# Patient Record
Sex: MALE | Race: WHITE | NOT HISPANIC OR LATINO | Employment: OTHER | URBAN - METROPOLITAN AREA
[De-identification: names, ages, dates, MRNs, and addresses within clinical notes are randomized per-mention and may not be internally consistent; named-entity substitution may affect disease eponyms.]

---

## 2019-05-23 ENCOUNTER — HOSPITAL ENCOUNTER (OUTPATIENT)
Dept: CT IMAGING | Facility: HOSPITAL | Age: 69
Discharge: HOME | End: 2019-05-23

## 2019-06-04 ENCOUNTER — CONVERSION ENCOUNTER (OUTPATIENT)
Dept: SURGERY | Facility: CLINIC | Age: 69
End: 2019-06-04

## 2019-06-04 VITALS
HEART RATE: 50 BPM | OXYGEN SATURATION: 98 % | HEIGHT: 70 IN | BODY MASS INDEX: 24.48 KG/M2 | WEIGHT: 171 LBS | SYSTOLIC BLOOD PRESSURE: 174 MMHG | DIASTOLIC BLOOD PRESSURE: 70 MMHG

## 2019-06-06 NOTE — PROGRESS NOTES
Visit Type:  Consult  Referring Provider:  Dr. Orta  Primary Provider:  Dr. Orta    CC:  6 month follow up CT chest/ RML lung cancer .    History of Present Illness:  Patient is seen in postoperative follow-up now 3 years out from right middle lobe resection for metastatic colorectal cancer.  Patient recently underwent colonoscopy and by his report has no evidence of recurrent colon cancer.  He presents to me with a   repeat CT scan of the chest which I personally examined.  There is no suspicious lesion in the lung.  There is no evidence of metastatic colorectal cancer in the chest.  The patient is clinically well.  He had some minor bleeding following the colonoscopy.  This is resolved.  No fevers chills or night sweats.  No cough no hemoptysis.  The patient is completed all review of systems.  They are all otherwise negative.  Medications are reviewed.  He is not anticoagulating.  On physical examination chest expansion symmetrical no supraclavicular cervical or tracheal adenopathy.  Abdomen is nondistended nontender.  Patient is a never smoker encouraged to stay smoke-free    Impression 1. History of colon cancer no evidence of recurrence 2.  History of metastatic colon cancer to the lung no evidence of recurrent there  Plan follow-up CT scan in 6 months      Past Medical History:     Reviewed history from 11/27/2018 and no changes required:        Colon Cancer        Nodule of Right Lung         Lung Cancer        blood transfusion 2012    Past Surgical History:     Reviewed history from 11/08/2016 and no changes required:        Right Lung Resection 6/2016        Cyst removed on top of head 2012        Hernia Right Side Left Side 2012        Colorectal Resection 2012    Family History Summary:      Reviewed history Last on 11/27/2018 and no changes required:06/04/2019  Brother - Has FH Heart Disease - Entered On: 11/8/2016  Mother - Has FH Hypertension - Entered On: 11/8/2016  Mother - Has FH Heart  Disease - Entered On: 2016  Father - Has FH Heart Disease - Entered On: 2016      Social History:     Reviewed history from 2018 and no changes required:        Patient has never smoked.        Alcohol Use: N        Drug Use: N        Regular Exercise: Y            Social History Summary:  Patient has never smoked.  Alcohol Use: N  Drug Use: N  Regular Exercise: Y  Social History Reviewed: 2019          Vital Signs:    Patient Profile:    68 Years Old Male  Height:     70 inches  Weight:     171 pounds  BMI:        24.53     O2 Sat:     98 %  Temp:       97.1 degrees F oral  Pulse rate: 50 / minute  Pulse rhythm:   regular  BP Sittin / 70  (left arm)    Cuff size:  regular      Problems: Active problems were reviewed with the patient during this visit.  Medications: Medications were reviewed with the patient during this visit.  Allergies: Allergies were reviewed with the patient during this visit.        Vitals Entered By: Sheree Lopez CMA (2019 7:59 AM)    Active Medications (reviewed today):  LOPERAMIDE HCL 2 MG ORAL CAPSULE (LOPERAMIDE HCL) Take two (2) capsules by mouth four times a day  DIPHENOXYLATE-ATROPINE 2.5-0.025 MG ORAL TABLET (DIPHENOXYLATE-ATROPINE) Take one (1) capsule by mouth four times a day  DICYCLOMINE HCL 10 MG ORAL CAPSULE (DICYCLOMINE HCL) Take one (1) capsule by mouth four times a day    Current Allergies (reviewed today):  IODINE (Critical)  * CONTRAST DYE (Critical)      Risk Factors:     Smoked Tobacco Use:  Never smoker  Smokeless Tobacco Use:  Never  Drug use:  no  Alcohol use:  no  Exercise:  yes  Seatbelt use:  100 %    Previous Tobacco Use: Signed On - 2018  Smoked Tobacco Use:  Never smoker  Smokeless Tobacco Use:  Never  Drug use:  no    Previous Alcohol Use: Signed On - 2018  Alcohol use:  no  Exercise:  yes  Seatbelt use:  100 %          Blood Pressure:  Today's BP: 174/70 mm Hg            Plan   Updated Medication List:    LOPERAMIDE HCL 2 MG ORAL CAPSULE (LOPERAMIDE HCL) Take two (2) capsules by mouth four times a day  DIPHENOXYLATE-ATROPINE 2.5-0.025 MG ORAL TABLET (DIPHENOXYLATE-ATROPINE) Take one (1) capsule by mouth four times a day  DICYCLOMINE HCL 10 MG ORAL CAPSULE (DICYCLOMINE HCL) Take one (1) capsule by mouth four times a day    New Orders:   [id number] Vst-Est Level IV [CPT-84820]  CT CHEST WITHOUT [CPT-41715]        Plan   Updated Medication List:   LOPERAMIDE HCL 2 MG ORAL CAPSULE (LOPERAMIDE HCL) Take two (2) capsules by mouth four times a day  DIPHENOXYLATE-ATROPINE 2.5-0.025 MG ORAL TABLET (DIPHENOXYLATE-ATROPINE) Take one (1) capsule by mouth four times a day  DICYCLOMINE HCL 10 MG ORAL CAPSULE (DICYCLOMINE HCL) Take one (1) capsule by mouth four times a day    New Orders:   [id number] Vst-Est Level IV [CPT-62388]  CT CHEST WITHOUT [CPT-80955]        Surgery Worksheet         ]      Electronically signed by Lizandro Rodriguez MD on 06/04/2019 at 10:39 AM  ________________________________________________________________________       Disclaimer: Converted Note message may not contain all data elements that existed in the legacy source system. Please see [employer] [facility] for the original note details.

## 2019-12-16 ENCOUNTER — HOSPITAL ENCOUNTER (OUTPATIENT)
Dept: CT IMAGING | Facility: HOSPITAL | Age: 69
Discharge: HOME | End: 2019-12-16

## 2019-12-19 ENCOUNTER — OFFICE VISIT (OUTPATIENT)
Dept: SURGERY | Facility: CLINIC | Age: 69
End: 2019-12-19

## 2019-12-19 VITALS
OXYGEN SATURATION: 98 % | BODY MASS INDEX: 23.39 KG/M2 | DIASTOLIC BLOOD PRESSURE: 69 MMHG | HEART RATE: 58 BPM | WEIGHT: 163 LBS | TEMPERATURE: 97.2 F | SYSTOLIC BLOOD PRESSURE: 128 MMHG

## 2019-12-19 DIAGNOSIS — C18.9 MALIGNANT NEOPLASM OF COLON, UNSPECIFIED PART OF COLON (HCC): ICD-10-CM

## 2019-12-19 DIAGNOSIS — C78.01 MALIGNANT NEOPLASM METASTATIC TO RIGHT LUNG (HCC): Primary | ICD-10-CM

## 2019-12-19 PROCEDURE — 99213 OFFICE O/P EST LOW 20 MIN: CPT | Performed by: THORACIC SURGERY (CARDIOTHORACIC VASCULAR SURGERY)

## 2019-12-19 RX ORDER — LOPERAMIDE HYDROCHLORIDE 2 MG/1
CAPSULE ORAL
COMMUNITY
Start: 2019-11-11 | End: 2022-11-02

## 2019-12-19 RX ORDER — DIPHENOXYLATE HYDROCHLORIDE AND ATROPINE SULFATE 2.5; .025 MG/1; MG/1
1 TABLET ORAL 4 TIMES DAILY
COMMUNITY
Start: 2019-10-09

## 2019-12-19 RX ORDER — DICYCLOMINE HYDROCHLORIDE 10 MG/1
10 CAPSULE ORAL
COMMUNITY
Start: 2016-11-04

## 2019-12-19 RX ORDER — LOSARTAN POTASSIUM AND HYDROCHLOROTHIAZIDE 12.5; 5 MG/1; MG/1
TABLET ORAL
COMMUNITY
Start: 2019-10-31 | End: 2022-11-02

## 2019-12-19 NOTE — PROGRESS NOTES
Thoracic surgery progress note    Chief complaint follow-up of metastatic colon cancer status post right middle lobe resection 3-1/2 years ago    Interval history the patient presents with a follow-up CT scan performed on December 16 which I personally examined.  There is no evidence of recurrent disease.  There is no evidence of local recurrence or mediastinal adenopathy.  I have reviewed the radiology report as well and concur with it.  Her    The patient has frequent bowel movements 4-5 a day.  No hematochezia.  Approximately 6 months ago he underwent a colonoscopy that was unremarkable.  As per his report.  Patient is not having any fevers chills or night sweats.  No cough no hemoptysis no change in voice his appetite is good his weight is stable.  The patient is a never smoker encouraged to stay smoke-free    Review of systems all systems reviewed the patient does not report any symptomatology.    On physical examination there is no supraclavicular cervical lymphadenopathy chest expansion symmetrical trachea is midline abdomen is nondistended nontender extremities free of cyanosis clubbing or edema    Impression #1 history of colon cancer status post partial colectomy no evidence of recurrent cancer either by physical examination or history.    2.  History of metastatic disease to the right middle lobe.  No evidence of recurrent cancer either symptomatically or radiographically    Plan follow-up CT scan in 6 months

## 2019-12-21 ENCOUNTER — HOSPITAL ENCOUNTER (OUTPATIENT)
Dept: OTHER | Facility: HOSPITAL | Age: 69
Discharge: HOME | End: 2019-12-21

## 2019-12-21 DIAGNOSIS — Z00.6 EXAMINATION FOR NORMAL COMPARISON OR CONTROL IN CLINICAL RESEARCH: ICD-10-CM

## 2020-06-17 ENCOUNTER — HOSPITAL ENCOUNTER (OUTPATIENT)
Dept: CT IMAGING | Facility: HOSPITAL | Age: 70
Discharge: HOME | End: 2020-06-17

## 2020-06-18 ENCOUNTER — OFFICE VISIT (OUTPATIENT)
Dept: SURGERY | Facility: CLINIC | Age: 70
End: 2020-06-18

## 2020-06-18 VITALS
DIASTOLIC BLOOD PRESSURE: 74 MMHG | BODY MASS INDEX: 23.34 KG/M2 | WEIGHT: 163 LBS | SYSTOLIC BLOOD PRESSURE: 151 MMHG | OXYGEN SATURATION: 98 % | HEIGHT: 70 IN | HEART RATE: 54 BPM | TEMPERATURE: 98.2 F

## 2020-06-18 DIAGNOSIS — C78.01 MALIGNANT NEOPLASM METASTATIC TO RIGHT LUNG (HCC): Primary | ICD-10-CM

## 2020-06-18 DIAGNOSIS — Z85.048 HISTORY OF RECTAL CANCER: ICD-10-CM

## 2020-06-18 PROCEDURE — 99213 OFFICE O/P EST LOW 20 MIN: CPT | Performed by: THORACIC SURGERY (CARDIOTHORACIC VASCULAR SURGERY)

## 2020-06-18 NOTE — PROGRESS NOTES
Thoracic surgery progress note    Chief complaint follow-up of metastatic colon cancer resected from the right middle lobe 4 years ago.    The patient is doing very well.  He still has frequent bowel movements.  No fever chills night sweats cough hemoptysis no abdominal pain.  He has had a colonoscopy within the last year without evidence of recurrent disease.  He reports no hematochezia.    All point review of systems reviewed with the patient.  It is otherwise negative.  Medications are reviewed.    The patient is a never smoker encouraged to stay smoke-free    On physical examination he has no supraclavicular lymphadenopathy chest expansion symmetrical.  Extremities free of cyanosis clubbing or edema    1.  History of metastatic colon cancer to the right middle lobe no evidence of recurrent disease  #2 frequent bowel movements under good control.  3.  History of colon cancer no evidence of recurrence negative colonoscopy    Plan follow-up 6 months with CT scan

## 2020-12-11 ENCOUNTER — HOSPITAL ENCOUNTER (OUTPATIENT)
Dept: CT IMAGING | Facility: HOSPITAL | Age: 70
Discharge: HOME | End: 2020-12-11

## 2020-12-22 ENCOUNTER — OFFICE VISIT (OUTPATIENT)
Dept: SURGERY | Facility: CLINIC | Age: 70
End: 2020-12-22

## 2020-12-22 VITALS
HEART RATE: 58 BPM | BODY MASS INDEX: 24.2 KG/M2 | DIASTOLIC BLOOD PRESSURE: 70 MMHG | TEMPERATURE: 98 F | WEIGHT: 169 LBS | SYSTOLIC BLOOD PRESSURE: 167 MMHG | OXYGEN SATURATION: 99 % | HEIGHT: 70 IN

## 2020-12-22 DIAGNOSIS — C78.01 MALIGNANT NEOPLASM METASTATIC TO RIGHT LUNG (HCC): Primary | ICD-10-CM

## 2020-12-22 PROCEDURE — 99212 OFFICE O/P EST SF 10 MIN: CPT | Performed by: THORACIC SURGERY (CARDIOTHORACIC VASCULAR SURGERY)

## 2020-12-22 NOTE — PROGRESS NOTES
Thoracic surgery progress note    Chief complaint history of rectal cancer with  metastases resected from the right lung 4 and half years ago    The patient returns with a follow-up CT scan that shows no evidence of recurrent cancer.  He is well-appearing and in no distress.  He has no new symptoms.  No fevers chills night sweats cough or hemoptysis.    He had a CT scan done at [facility] on December 11 that showed no evidence of recurrent disease.    On physical examination he needs and well-appearing and in no distress.    I discussed with the patient that I will be moving to [place].  He wishes to follow-up locally with Dr. Hong.  I thought it was a good idea to get a repeat CT scan in 1 year.  A copy of this note will be given to the patient

## 2022-05-26 ENCOUNTER — TRANSCRIBE ORDERS (OUTPATIENT)
Dept: ADMINISTRATIVE | Facility: HOSPITAL | Age: 72
End: 2022-05-26

## 2022-05-26 DIAGNOSIS — R91.8 PULMONARY NODULES: Primary | ICD-10-CM

## 2022-06-15 ENCOUNTER — CLINICAL SUPPORT (OUTPATIENT)
Dept: GASTROENTEROLOGY | Facility: CLINIC | Age: 72
End: 2022-06-15

## 2022-06-15 ENCOUNTER — PREP FOR SURGERY (OUTPATIENT)
Dept: OTHER | Facility: HOSPITAL | Age: 72
End: 2022-06-15

## 2022-06-15 DIAGNOSIS — Z12.11 SCREENING FOR COLON CANCER: Primary | ICD-10-CM

## 2022-06-15 DIAGNOSIS — Z12.11 COLON CANCER SCREENING: Primary | ICD-10-CM

## 2022-06-15 RX ORDER — SODIUM, POTASSIUM,MAG SULFATES 17.5-3.13G
1 SOLUTION, RECONSTITUTED, ORAL ORAL EVERY 12 HOURS
Qty: 354 ML | Refills: 0 | Status: SHIPPED | OUTPATIENT
Start: 2022-06-15 | End: 2022-10-21 | Stop reason: SDUPTHER

## 2022-06-15 RX ORDER — LISINOPRIL 20 MG/1
20 TABLET ORAL DAILY
COMMUNITY
End: 2023-03-01

## 2022-06-15 RX ORDER — LOSARTAN POTASSIUM 50 MG/1
50 TABLET ORAL DAILY
COMMUNITY
Start: 2022-05-25 | End: 2023-03-01

## 2022-06-15 RX ORDER — CIPROFLOXACIN 500 MG/1
TABLET, FILM COATED ORAL
COMMUNITY
End: 2022-11-02

## 2022-06-15 RX ORDER — HYDROCHLOROTHIAZIDE 25 MG/1
25 TABLET ORAL DAILY
COMMUNITY
Start: 2022-05-25

## 2022-06-15 RX ORDER — HYDROCODONE BITARTRATE AND ACETAMINOPHEN 10; 325 MG/1; MG/1
TABLET ORAL
COMMUNITY
End: 2022-11-02

## 2022-06-15 RX ORDER — FLUVASTATIN SODIUM 80 MG/1
TABLET, FILM COATED, EXTENDED RELEASE ORAL
COMMUNITY
End: 2023-03-01

## 2022-06-15 NOTE — PROGRESS NOTES
Salvador Huertas  REASON FOR CALL Screening for colonoscopy   SENT IN PREP Suprep   History reviewed. No pertinent past medical history.  Allergies   Allergen Reactions   • Contrast Dye Unknown - Low Severity   • Iodine Unknown - Low Severity     Past Surgical History:   Procedure Laterality Date   • CATARACT EXTRACTION, BILATERAL Bilateral 06/2020   • COLON RESECTION  2012   • CYST REMOVAL  2012    CYST REMOVAL TOP OF HEAD   • HERNIA REPAIR Right 2012   • LUNG SURGERY Right 06/06/2016    INTUBATING BRONCH, RIGHT VATS WEDGE RESEC. RML PN,       Social History     Socioeconomic History   • Marital status:    Tobacco Use   • Smoking status: Never Smoker   • Smokeless tobacco: Never Used     Family History   Problem Relation Age of Onset   • Hypertension Mother    • Heart disease Mother    • Heart disease Father    • Heart disease Brother        Current Outpatient Medications:   •  dicyclomine (BENTYL) 10 MG capsule, DICYCLOMINE HCL 10 MG CAPS, Disp: , Rfl:   •  diphenoxylate-atropine (LOMOTIL) 2.5-0.025 MG per tablet, Take 1 tablet by mouth 4 (Four) Times a Day., Disp: , Rfl:   •  hydroCHLOROthiazide (HYDRODIURIL) 25 MG tablet, , Disp: , Rfl:   •  lisinopril (PRINIVIL,ZESTRIL) 20 MG tablet, Take  by mouth., Disp: , Rfl:   •  loperamide (IMODIUM) 2 MG capsule, TAKE 2 CAPSULES BY MOUTH 4 TIMES DAILY, Disp: , Rfl:   •  losartan (COZAAR) 50 MG tablet, , Disp: , Rfl:   •  ciprofloxacin (CIPRO) 500 MG tablet, Take  by mouth., Disp: , Rfl:   •  fluvastatin XL (LESCOL XL) 80 MG 24 hr tablet, Take  by mouth., Disp: , Rfl:   •  HYDROcodone-acetaminophen (NORCO)  MG per tablet, Take  by mouth., Disp: , Rfl:   •  losartan-hydrochlorothiazide (HYZAAR) 50-12.5 MG per tablet, , Disp: , Rfl:

## 2022-09-13 ENCOUNTER — HOSPITAL ENCOUNTER (OUTPATIENT)
Dept: CT IMAGING | Facility: HOSPITAL | Age: 72
Discharge: HOME | End: 2022-09-13
Admitting: FAMILY MEDICINE

## 2022-09-13 DIAGNOSIS — R91.8 PULMONARY NODULES: ICD-10-CM

## 2022-09-13 PROCEDURE — 71250 CT THORAX DX C-: CPT

## 2022-10-24 RX ORDER — SODIUM, POTASSIUM,MAG SULFATES 17.5-3.13G
1 SOLUTION, RECONSTITUTED, ORAL ORAL EVERY 12 HOURS
Qty: 354 ML | Refills: 0 | Status: SHIPPED | OUTPATIENT
Start: 2022-10-24 | End: 2022-11-03 | Stop reason: HOSPADM

## 2022-11-03 ENCOUNTER — HOSPITAL ENCOUNTER (OUTPATIENT)
Facility: HOSPITAL | Age: 72
Setting detail: HOSPITAL OUTPATIENT SURGERY
Discharge: HOME | End: 2022-11-03
Attending: INTERNAL MEDICINE | Admitting: INTERNAL MEDICINE

## 2022-11-03 ENCOUNTER — ANESTHESIA EVENT (OUTPATIENT)
Dept: GASTROENTEROLOGY | Facility: HOSPITAL | Age: 72
End: 2022-11-03

## 2022-11-03 ENCOUNTER — ANESTHESIA (OUTPATIENT)
Dept: GASTROENTEROLOGY | Facility: HOSPITAL | Age: 72
End: 2022-11-03

## 2022-11-03 VITALS
OXYGEN SATURATION: 98 % | WEIGHT: 157.41 LBS | BODY MASS INDEX: 22.54 KG/M2 | SYSTOLIC BLOOD PRESSURE: 145 MMHG | HEART RATE: 52 BPM | DIASTOLIC BLOOD PRESSURE: 68 MMHG | TEMPERATURE: 98.7 F | RESPIRATION RATE: 18 BRPM | HEIGHT: 70 IN

## 2022-11-03 DIAGNOSIS — Z12.11 SCREENING FOR COLON CANCER: ICD-10-CM

## 2022-11-03 PROCEDURE — 45385 COLONOSCOPY W/LESION REMOVAL: CPT | Performed by: INTERNAL MEDICINE

## 2022-11-03 PROCEDURE — 45380 COLONOSCOPY AND BIOPSY: CPT | Performed by: INTERNAL MEDICINE

## 2022-11-03 PROCEDURE — 25010000002 PROPOFOL 10 MG/ML EMULSION: Performed by: NURSE ANESTHETIST, CERTIFIED REGISTERED

## 2022-11-03 PROCEDURE — 88305 TISSUE EXAM BY PATHOLOGIST: CPT | Performed by: INTERNAL MEDICINE

## 2022-11-03 RX ORDER — LIDOCAINE HYDROCHLORIDE 20 MG/ML
INJECTION, SOLUTION EPIDURAL; INFILTRATION; INTRACAUDAL; PERINEURAL AS NEEDED
Status: DISCONTINUED | OUTPATIENT
Start: 2022-11-03 | End: 2022-11-03 | Stop reason: SURG

## 2022-11-03 RX ORDER — PROPOFOL 10 MG/ML
VIAL (ML) INTRAVENOUS AS NEEDED
Status: DISCONTINUED | OUTPATIENT
Start: 2022-11-03 | End: 2022-11-03 | Stop reason: SURG

## 2022-11-03 RX ORDER — SODIUM CHLORIDE, SODIUM LACTATE, POTASSIUM CHLORIDE, CALCIUM CHLORIDE 600; 310; 30; 20 MG/100ML; MG/100ML; MG/100ML; MG/100ML
30 INJECTION, SOLUTION INTRAVENOUS CONTINUOUS
Status: DISCONTINUED | OUTPATIENT
Start: 2022-11-03 | End: 2022-11-03 | Stop reason: HOSPADM

## 2022-11-03 RX ADMIN — PROPOFOL 50 MG: 10 INJECTION, EMULSION INTRAVENOUS at 10:07

## 2022-11-03 RX ADMIN — LIDOCAINE HYDROCHLORIDE 30 MG: 20 INJECTION, SOLUTION EPIDURAL; INFILTRATION; INTRACAUDAL; PERINEURAL at 10:07

## 2022-11-03 RX ADMIN — SODIUM CHLORIDE, POTASSIUM CHLORIDE, SODIUM LACTATE AND CALCIUM CHLORIDE 30 ML/HR: 600; 310; 30; 20 INJECTION, SOLUTION INTRAVENOUS at 09:22

## 2022-11-03 RX ADMIN — PROPOFOL 150 MCG/KG/MIN: 10 INJECTION, EMULSION INTRAVENOUS at 10:07

## 2022-11-03 NOTE — ANESTHESIA POSTPROCEDURE EVALUATION
Patient: Salvador Huertas    Procedure Summary     Date: 11/03/22 Room / Location: [facility] ENDOSCOPY 4 / [facility] ENDOSCOPY    Anesthesia Start: 1004 Anesthesia Stop: 1029    Procedure: COLONOSCOPY WITH BIOPSIES, POLYPECTOMY HOT SNARE Diagnosis:       Screening for colon cancer      (Screening for colon cancer [Z12.11])    Surgeons: Tata Villatoro MD Provider: Theodore Alberto MD    Anesthesia Type: general ASA Status: 2          Anesthesia Type: general    Vitals  Vitals Value Taken Time   /70 11/03/22 1028   Temp 37.1 °C (98.7 °F) 11/03/22 1028   Pulse 59 11/03/22 1032   Resp 18 11/03/22 1028   SpO2 98 % 11/03/22 1032   Vitals shown include unvalidated device data.        Post Anesthesia Care and Evaluation    Patient location during evaluation: bedside  Patient participation: complete - patient participated  Level of consciousness: awake  Pain management: adequate    Airway patency: patent  Anesthetic complications: No anesthetic complications  PONV Status: none  Cardiovascular status: acceptable and stable  Respiratory status: acceptable  Hydration status: acceptable    Comments: An Anesthesiologist personally participated in the most demanding procedures (including induction and emergence if applicable) in the anesthesia plan, monitored the course of anesthesia administration at frequent intervals and remained physically present and available for immediate diagnosis and treatment of emergencies.

## 2022-11-03 NOTE — ANESTHESIA PREPROCEDURE EVALUATION
Anesthesia Evaluation     Patient summary reviewed and Nursing notes reviewed   no history of anesthetic complications:  NPO Solid Status: > 8 hours  NPO Liquid Status: > 2 hours           Airway   Mallampati: I  TM distance: >3 FB  Neck ROM: full  No difficulty expected  Dental - normal exam     Pulmonary - normal exam   (+) lung cancer,   Cardiovascular - normal exam  Exercise tolerance: good (4-7 METS)    (+) hypertension well controlled,       Neuro/Psych- negative ROS  GI/Hepatic/Renal/Endo - negative ROS     Musculoskeletal (-) negative ROS    Abdominal  - normal exam    Bowel sounds: normal.   Substance History - negative use     OB/GYN negative ob/gyn ROS         Other      history of cancer remission    ROS/Med Hx Other: PAT Nursing Notes unavailable.                   Anesthesia Plan    ASA 2     general     (Patient understands anesthesia not responsible for dental damage.)  intravenous induction     Anesthetic plan, risks, benefits, and alternatives have been provided, discussed and informed consent has been obtained with: patient.  Pre-procedure education provided  Plan discussed with CRNA.        CODE STATUS:

## 2022-11-03 NOTE — H&P
Pre Procedure History & Physical    Chief Complaint:   Screening colonoscopy    Subjective     HPI:   72 yo M here for screening colonoscopy.    Past Medical History:   Past Medical History:   Diagnosis Date   • Cancer     Colorectal cancer   • Hypertension        Past Surgical History:  Past Surgical History:   Procedure Laterality Date   • CATARACT EXTRACTION, BILATERAL Bilateral 06/2020   • COLON RESECTION  2012   • COLONOSCOPY     • CYST REMOVAL  2012    CYST REMOVAL TOP OF HEAD   • HERNIA REPAIR Right 2012   • LUNG SURGERY Right 06/06/2016    INTUBATING BRONCH, RIGHT VATS WEDGE RESEC. RML PN,         Family History:  Family History   Problem Relation Age of Onset   • Hypertension Mother    • Heart disease Mother    • Heart disease Father    • Heart disease Brother    • Malig Hyperthermia Neg Hx        Social History:   reports that he has never smoked. He has never used smokeless tobacco. He reports that he does not drink alcohol and does not use drugs.    Medications:   Medications Prior to Admission   Medication Sig Dispense Refill Last Dose   • dicyclomine (BENTYL) 10 MG capsule Take 1 capsule by mouth 4 (Four) Times a Day Before Meals & at Bedtime.   10/31/2022   • diphenoxylate-atropine (LOMOTIL) 2.5-0.025 MG per tablet Take 1 tablet by mouth 4 (Four) Times a Day.   10/31/2022   • hydroCHLOROthiazide (HYDRODIURIL) 25 MG tablet Take 1 tablet by mouth Daily.   10/31/2022   • lisinopril (PRINIVIL,ZESTRIL) 20 MG tablet Take 1 tablet by mouth Daily.   10/31/2022   • LOPERAMIDE HCL PO Take 2 capsules by mouth Daily.   10/31/2022   • losartan (COZAAR) 50 MG tablet Take 1 tablet by mouth Daily.   10/31/2022   • fluvastatin XL (LESCOL XL) 80 MG 24 hr tablet Take  by mouth.   10/31/2022   • sodium-potassium-magnesium sulfates (Suprep Bowel Prep Kit) 17.5-3.13-1.6 GM/177ML solution oral solution Take 1 bottle by mouth Every 12 (Twelve) Hours. (Patient not taking: Reported on 11/3/2022) 354 mL 0 Not Taking  "      Allergies:  Contrast dye and Iodine    ROS:    Pertinent items are noted in HPI     Objective     Blood pressure 171/79, pulse 58, temperature 97.9 °F (36.6 °C), temperature source Temporal, resp. rate 18, height 177.8 cm (70\"), weight 71.4 kg (157 lb 6.5 oz), SpO2 97 %.    Physical Exam   Constitutional: Pt is oriented to person, place, and time and well-developed, well-nourished, and in no distress.   Mouth/Throat: Oropharynx is clear and moist.   Neck: Normal range of motion.   Cardiovascular: Normal rate, regular rhythm and normal heart sounds.    Pulmonary/Chest: Effort normal and breath sounds normal.   Abdominal: Soft. Nontender  Skin: Skin is warm and dry.   Psychiatric: Mood, memory, affect and judgment normal.     Assessment & Plan     Diagnosis:  Screening colonoscopy    Anticipated Surgical Procedure:  Colonoscopy    The risks, benefits, and alternatives of this procedure have been discussed with the patient or the responsible party- the patient understands and agrees to proceed.          "

## 2022-11-04 ENCOUNTER — TELEPHONE (OUTPATIENT)
Dept: GASTROENTEROLOGY | Facility: CLINIC | Age: 72
End: 2022-11-04

## 2022-11-04 DIAGNOSIS — K63.5 POLYP OF COLON: ICD-10-CM

## 2022-11-04 DIAGNOSIS — K52.9 COLITIS: Primary | ICD-10-CM

## 2022-11-04 LAB
CYTO UR: NORMAL
LAB AP CASE REPORT: NORMAL
LAB AP CLINICAL INFORMATION: NORMAL
PATH REPORT.FINAL DX SPEC: NORMAL
PATH REPORT.GROSS SPEC: NORMAL

## 2022-11-04 NOTE — TELEPHONE ENCOUNTER
----- Message from LARISA Cross sent at 11/4/2022 11:25 AM EDT -----  Rectal biopsy is consistent with ulcer.  I would like to treat this with a course of mesalamine.  I have sent this to patient's pharmacy.  Please schedule patient for office visit to discuss symptoms if he is having any.  Would also recommend labs.  Please have patient obtain these to check kidney function and rule out ulcerative colitis.

## 2022-11-17 NOTE — TELEPHONE ENCOUNTER
Spoke to pt & spouse informing of Anuradha PERES result note and recommendations. F/U apt scheduled next available on 3/1/2023. Pt and spouse verified understanding.     Pt does not report any concerns or issues, stating that he is not experiencing any diarrhea or rectal bleeding. Pt states that he does experiencing cramping from time to time but does not report it severe.     Mesalamine was not sent to Pharmacy. Please advise.

## 2022-11-17 NOTE — TELEPHONE ENCOUNTER
Spouse LVM on [facility] requesting call back on cell [phone].    Called provided number; LVM.

## 2022-11-18 RX ORDER — MESALAMINE 1000 MG/1
1000 SUPPOSITORY RECTAL NIGHTLY
Qty: 30 SUPPOSITORY | Refills: 2 | Status: SHIPPED | OUTPATIENT
Start: 2022-11-18 | End: 2022-12-18

## 2022-11-18 NOTE — TELEPHONE ENCOUNTER
I have discussed with Dr. Villatoro and a prescription for Canasa sent to pharmacy ([facility]). Please advise patient to complete ordered lab work as planned.

## 2023-03-01 ENCOUNTER — OFFICE VISIT (OUTPATIENT)
Dept: GASTROENTEROLOGY | Facility: CLINIC | Age: 73
End: 2023-03-01
Payer: MEDICARE

## 2023-03-01 VITALS
HEIGHT: 70 IN | DIASTOLIC BLOOD PRESSURE: 65 MMHG | SYSTOLIC BLOOD PRESSURE: 135 MMHG | WEIGHT: 163 LBS | HEART RATE: 68 BPM | BODY MASS INDEX: 23.34 KG/M2

## 2023-03-01 DIAGNOSIS — Z85.038 HISTORY OF COLON CANCER: ICD-10-CM

## 2023-03-01 DIAGNOSIS — K62.6 RECTAL/ANAL ULCER: Primary | ICD-10-CM

## 2023-03-01 DIAGNOSIS — Z90.49 HISTORY OF COLON RESECTION: ICD-10-CM

## 2023-03-01 DIAGNOSIS — R19.7 DIARRHEA, UNSPECIFIED TYPE: ICD-10-CM

## 2023-03-01 DIAGNOSIS — K62.89 PROCTITIS: ICD-10-CM

## 2023-03-01 PROCEDURE — 99214 OFFICE O/P EST MOD 30 MIN: CPT | Performed by: NURSE PRACTITIONER

## 2023-03-01 RX ORDER — LOSARTAN POTASSIUM 100 MG/1
TABLET ORAL
COMMUNITY
Start: 2023-02-17

## 2023-03-01 RX ORDER — MESALAMINE 1000 MG/1
SUPPOSITORY RECTAL
COMMUNITY
Start: 2023-02-15

## 2023-03-01 NOTE — PROGRESS NOTES
Chief Complaint     Rectal Problems and Follow-up (Rectal ulcer and colonoscopy follow up)    History of Present Illness     Salvador Huertas is a 72 y.o. male who presents to [facility] for follow-up of colonoscopy and rectal ulcer.    Mr. Huertas was scheduled for routine screening colonoscopy.  Colonoscopy revealed erythema and ulceration in the rectum.    He reports being diagnosed with colon cancer in 2012.  He underwent colon resection per Dr. Orta and was treated with radiation and chemotherapy.  He reports metastasis of colon cancer to lung and also underwent partial lobectomy.     Since the time of his colon resection in 2012, he has experienced fecal incontinence and diarrhea.  He is managing symptoms with Lomotil, loperamide and dicyclomine.  States that he has been on this regimen for the last 10 years.  He will often take all 3 medications in the morning and again at lunchtime.  States that if he plans to be at home he does not take them again in the evening.  Reports that he has up to 12 bowel movements per day which are mostly in the evening.      He denies rectal bleeding.  States that this hasn't been present for years.      He is using canasa suppositories nightly as prescribed following colonoscopy.  He has not noted any changes since starting these.        He was also prescribed cholestyramine powder in the past.  If he takes this, notices that stools are more formed, but he's unable to control bowel movements.         History      Past Medical History:   Diagnosis Date   • Cancer     Colorectal cancer   • Hypertension      Past Surgical History:   Procedure Laterality Date   • CATARACT EXTRACTION, BILATERAL Bilateral 06/2020   • COLON RESECTION  2012   • COLONOSCOPY     • COLONOSCOPY N/A 11/03/2022    Procedure: COLONOSCOPY WITH BIOPSIES, POLYPECTOMY HOT SNARE;  Surgeon: Tata Villatoro MD;  Location: [facility] ENDOSCOPY;  Service:  "Gastroenterology;  Laterality: N/A;  COLON POLYP, RECTUM ULCER   • CYST REMOVAL  2012    CYST REMOVAL TOP OF HEAD   • HERNIA REPAIR Right 2012   • LUNG SURGERY Right 06/06/2016    INTUBATING BRONCH, RIGHT VATS WEDGE RESEC. RML PN,       Family History   Problem Relation Age of Onset   • Hypertension Mother    • Heart disease Mother    • Heart disease Father    • Heart disease Brother    • Malig Hyperthermia Neg Hx         Current Medications       Current Outpatient Medications:   •  dicyclomine (BENTYL) 10 MG capsule, Take 1 capsule by mouth 4 (Four) Times a Day Before Meals & at Bedtime., Disp: , Rfl:   •  diphenoxylate-atropine (LOMOTIL) 2.5-0.025 MG per tablet, Take 1 tablet by mouth 4 (Four) Times a Day., Disp: , Rfl:   •  hydroCHLOROthiazide (HYDRODIURIL) 25 MG tablet, Take 1 tablet by mouth Daily., Disp: , Rfl:   •  LOPERAMIDE HCL PO, Take 2 capsules by mouth Daily., Disp: , Rfl:   •  losartan (COZAAR) 100 MG tablet, , Disp: , Rfl:   •  mesalamine (CANASA) 1000 MG suppository, , Disp: , Rfl:      Allergies     Allergies   Allergen Reactions   • Contrast Dye (Echo Or Unknown Ct/Mr) Unknown - Low Severity   • Iodine Unknown - Low Severity       Social History       Social History     Social History Narrative   • Not on file         Objective       /65 (BP Location: Left arm, Patient Position: Sitting, Cuff Size: Adult)   Pulse 68   Ht 177.8 cm (70\")   Wt 73.9 kg (163 lb)   BMI 23.39 kg/m²       Physical Exam    Results       Result Review :    The following data was reviewed by: LARISA Cross on 03/01/2023:    11/3/2022 colonoscopy-perianal and digital rectal exams were normal.  10 mm polyp found in the cecum, tubulovillous adenoma, completely removed.  Evidence of prior end-to-side colon-anal anastomosis in the right dome.  Diffuse severe inflammation characterized by congestion, friability and loss of disc and deep ulcerations found in the blind end of the end-to-side " anastomosis.  The circumferential ulceration extended from approximately 3 cm to 11 cm from the anorectal verge.  Rectal biopsy-granulation tissue and fibropurulent debris consistent with ulcer.                   Assessment and Plan              Diagnoses and all orders for this visit:    1. Rectal/anal ulcer (Primary)    2. Proctitis    3. Diarrhea, unspecified type  -     Clostridioides difficile Toxin - Stool, Per Rectum; Future  -     Calprotectin, Fecal - Stool, Per Rectum; Future  -     Enteric Parasite Panel - Stool, Per Rectum; Future  -     Enteric Bacterial Panel - Stool, Per Rectum; Future    4. History of colon cancer    5. History of colon resection        Stool studies to rule out infection and IBD.  If fecal calprotectin elevated, consider alternate treatment.  If stool studies unrevealing, recommend to transition off of lomitil and try colestid/dicyclomine dual therapy.      Follow Up     Follow Up   Return in about 3 months (around 6/1/2023) for Diarrhea.  Patient was given instructions and counseling regarding his condition or for health maintenance advice. Please see specific information pulled into the AVS if appropriate.

## 2023-03-15 ENCOUNTER — TELEPHONE (OUTPATIENT)
Dept: GASTROENTEROLOGY | Facility: CLINIC | Age: 73
End: 2023-03-15
Payer: MEDICARE

## 2023-03-15 NOTE — TELEPHONE ENCOUNTER
Spoke with patients wife Ritika who is on his listed Verbal release, she states he has not completed stool studies but does still plan to

## 2023-03-20 ENCOUNTER — LAB (OUTPATIENT)
Dept: LAB | Facility: HOSPITAL | Age: 73
End: 2023-03-20
Payer: MEDICARE

## 2023-03-20 DIAGNOSIS — R19.7 DIARRHEA, UNSPECIFIED TYPE: ICD-10-CM

## 2023-03-20 LAB
027 TOXIN: NORMAL
C DIFF TOX GENS STL QL NAA+PROBE: NEGATIVE

## 2023-03-20 PROCEDURE — 87493 C DIFF AMPLIFIED PROBE: CPT

## 2023-03-20 PROCEDURE — 87506 IADNA-DNA/RNA PROBE TQ 6-11: CPT

## 2023-03-20 PROCEDURE — 83993 ASSAY FOR CALPROTECTIN FECAL: CPT

## 2023-03-21 ENCOUNTER — TELEPHONE (OUTPATIENT)
Dept: GASTROENTEROLOGY | Facility: CLINIC | Age: 73
End: 2023-03-21
Payer: MEDICARE

## 2023-03-21 LAB
C COLI+JEJ+UPSA DNA STL QL NAA+NON-PROBE: NOT DETECTED
CRYPTOSP DNA STL QL NAA+NON-PROBE: NOT DETECTED
E HISTOLYT DNA STL QL NAA+NON-PROBE: NOT DETECTED
EC STX1+STX2 GENES STL QL NAA+NON-PROBE: NOT DETECTED
G LAMBLIA DNA STL QL NAA+NON-PROBE: NOT DETECTED
S ENT+BONG DNA STL QL NAA+NON-PROBE: NOT DETECTED
SHIGELLA SP+EIEC IPAH ST NAA+NON-PROBE: NOT DETECTED

## 2023-03-21 NOTE — PROGRESS NOTES
So far stool studies have come back negative.  Calprotectin still pending.  How is the patient doing?

## 2023-03-21 NOTE — TELEPHONE ENCOUNTER
----- Message from LARISA Baer sent at 3/21/2023 11:21 AM EDT -----  So far stool studies have come back negative.  Calprotectin still pending.  How is the patient doing?

## 2023-03-23 LAB — CALPROTECTIN STL-MCNT: 403 UG/G (ref 0–120)

## 2023-03-29 ENCOUNTER — TELEPHONE (OUTPATIENT)
Dept: GASTROENTEROLOGY | Facility: CLINIC | Age: 73
End: 2023-03-29
Payer: MEDICARE

## 2023-03-29 RX ORDER — PREDNISONE 10 MG/1
TABLET ORAL
Qty: 50 TABLET | Refills: 0 | Status: SHIPPED | OUTPATIENT
Start: 2023-03-29 | End: 2023-04-18

## 2023-03-29 NOTE — TELEPHONE ENCOUNTER
----- Message from LARISA Cross sent at 3/29/2023 12:57 PM EDT -----  Please let patient know that stool studies are negative for infection and parasites.  However his fecal calprotectin is elevated consistent with inflammation.  I would like to start him on prednisone to help with inflammation and see if this improves diarrhea.  Rx sent.

## 2024-01-15 ENCOUNTER — OFFICE VISIT (OUTPATIENT)
Dept: GASTROENTEROLOGY | Facility: CLINIC | Age: 74
End: 2024-01-15
Payer: MEDICARE

## 2024-01-15 VITALS
WEIGHT: 160 LBS | HEART RATE: 58 BPM | DIASTOLIC BLOOD PRESSURE: 57 MMHG | HEIGHT: 70 IN | BODY MASS INDEX: 22.9 KG/M2 | SYSTOLIC BLOOD PRESSURE: 142 MMHG

## 2024-01-15 DIAGNOSIS — R19.7 DIARRHEA, UNSPECIFIED TYPE: ICD-10-CM

## 2024-01-15 DIAGNOSIS — Z90.49 HISTORY OF COLON RESECTION: Primary | ICD-10-CM

## 2024-01-15 DIAGNOSIS — K91.2 POSTSURGICAL MALABSORPTION, NOT ELSEWHERE CLASSIFIED: ICD-10-CM

## 2024-01-15 DIAGNOSIS — K62.89 PROCTITIS: ICD-10-CM

## 2024-01-15 DIAGNOSIS — K52.9 COLITIS: ICD-10-CM

## 2024-01-15 PROCEDURE — 99214 OFFICE O/P EST MOD 30 MIN: CPT | Performed by: NURSE PRACTITIONER

## 2024-01-15 PROCEDURE — 1159F MED LIST DOCD IN RCRD: CPT | Performed by: NURSE PRACTITIONER

## 2024-01-15 PROCEDURE — 1160F RVW MEDS BY RX/DR IN RCRD: CPT | Performed by: NURSE PRACTITIONER

## 2024-01-15 RX ORDER — MONTELUKAST SODIUM 4 MG/1
1-2 TABLET, CHEWABLE ORAL 2 TIMES DAILY
Qty: 120 TABLET | Refills: 1 | Status: SHIPPED | OUTPATIENT
Start: 2024-01-15

## 2024-01-15 RX ORDER — MESALAMINE 0.38 G/1
1500 CAPSULE, EXTENDED RELEASE ORAL DAILY
Qty: 360 CAPSULE | Refills: 1 | Status: SHIPPED | OUTPATIENT
Start: 2024-01-15

## 2024-01-15 NOTE — PROGRESS NOTES
Chief Complaint     Ulcerative Colitis (6m follow up )    History of Present Illness     Salvador Huertas is a 73 y.o. male who presents to [facility] GASTROENTEROLOGY for follow-up of colitis, history of colon resection, diarrhea and proctitis.      Reports two episodes of rectal bleeding with a bowel movement last month.      He is taking loperamide and lomotil and dicyclomine every morning and will sometimes take again in the afternoon.      Denies abdominal pain.      Reports that when he has several bowel movements in a day, notices a mucous discharge from rectum.  This has been present for the last 12 years.  Since having colon resection.         History      Past Medical History:   Diagnosis Date    Cancer     Colorectal cancer    Hypertension      Past Surgical History:   Procedure Laterality Date    CATARACT EXTRACTION, BILATERAL Bilateral 06/2020    COLON RESECTION  2012    COLONOSCOPY      COLONOSCOPY N/A 11/03/2022    Procedure: COLONOSCOPY WITH BIOPSIES, POLYPECTOMY HOT SNARE;  Surgeon: Tata Villatoro MD;  Location: [facility] ENDOSCOPY;  Service: Gastroenterology;  Laterality: N/A;  COLON POLYP, RECTUM ULCER    CYST REMOVAL  2012    CYST REMOVAL TOP OF HEAD    HERNIA REPAIR Right 2012    LUNG SURGERY Right 06/06/2016    INTUBATING BRONCH, RIGHT VATS WEDGE RESEC. RML PN,       Family History   Problem Relation Age of Onset    Hypertension Mother     Heart disease Mother     Heart disease Father     Heart disease Brother     Malig Hyperthermia Neg Hx         Current Medications       Current Outpatient Medications:     dicyclomine (BENTYL) 10 MG capsule, Take 1 capsule by mouth 4 (Four) Times a Day Before Meals & at Bedtime., Disp: , Rfl:     diphenoxylate-atropine (LOMOTIL) 2.5-0.025 MG per tablet, Take 1 tablet by mouth 4 (Four) Times a Day., Disp: , Rfl:     hydroCHLOROthiazide (HYDRODIURIL) 25 MG tablet, Take 1 tablet by mouth Daily., Disp: , Rfl:     LOPERAMIDE  "HCL PO, Take 2 capsules by mouth Daily., Disp: , Rfl:     losartan (COZAAR) 100 MG tablet, , Disp: , Rfl:     mesalamine (Apriso) 0.375 g 24 hr capsule, Take 4 capsules by mouth Daily., Disp: 360 capsule, Rfl: 1    colestipol (COLESTID) 1 g tablet, Take 1-2 tablets by mouth 2 (Two) Times a Day., Disp: 120 tablet, Rfl: 1     Allergies     Allergies   Allergen Reactions    Contrast Dye (Echo Or Unknown Ct/Mr) Unknown - Low Severity    Iodine Unknown - Low Severity       Social History       Social History     Social History Narrative    Not on file         Objective       /57 (BP Location: Left arm, Patient Position: Sitting, Cuff Size: Adult)   Pulse 58   Ht 177.8 cm (70\")   Wt 72.6 kg (160 lb)   BMI 22.96 kg/m²       Physical Exam    Results       Result Review :    The following data was reviewed by: LARISA Cross on 01/15/2024:                   Assessment and Plan              Diagnoses and all orders for this visit:    1. History of colon resection (Primary)  -     Cancel: CBC Auto Differential; Future  -     Cancel: Comprehensive Metabolic Panel; Future  -     Cancel: C-reactive Protein; Future  -     Cancel: Iron Profile; Future  -     Cancel: Sedimentation Rate; Future  -     Cancel: Vitamin D,25-Hydroxy; Future  -     C-reactive Protein; Future  -     CBC Auto Differential; Future  -     Comprehensive Metabolic Panel; Future  -     Iron Profile; Future  -     Sedimentation Rate; Future  -     Vitamin D,25-Hydroxy; Future    2. Proctitis    3. Diarrhea, unspecified type  -     Calprotectin, Fecal - Stool, Per Rectum; Future  -     Cancel: CBC Auto Differential; Future  -     Cancel: Comprehensive Metabolic Panel; Future  -     Cancel: C-reactive Protein; Future  -     Cancel: Iron Profile; Future  -     Cancel: Sedimentation Rate; Future  -     Cancel: Vitamin D,25-Hydroxy; Future  -     C-reactive Protein; Future  -     CBC Auto Differential; Future  -     Comprehensive Metabolic Panel; " Future  -     Iron Profile; Future  -     Sedimentation Rate; Future  -     Vitamin D,25-Hydroxy; Future    4. Colitis  -     mesalamine (Apriso) 0.375 g 24 hr capsule; Take 4 capsules by mouth Daily.  Dispense: 360 capsule; Refill: 1    5. Postsurgical malabsorption, not elsewhere classified  -     Cancel: Iron Profile; Future  -     Cancel: Vitamin D,25-Hydroxy; Future  -     Iron Profile; Future  -     Vitamin D,25-Hydroxy; Future    Other orders  -     colestipol (COLESTID) 1 g tablet; Take 1-2 tablets by mouth 2 (Two) Times a Day.  Dispense: 120 tablet; Refill: 1          Follow Up     Follow Up   Return in about 6 months (around 7/15/2024) for Diarrhea and colitis.  Patient was given instructions and counseling regarding his condition or for health maintenance advice. Please see specific information pulled into the AVS if appropriate.

## 2024-01-29 ENCOUNTER — LAB (OUTPATIENT)
Dept: LAB | Facility: HOSPITAL | Age: 74
End: 2024-01-29
Payer: MEDICARE

## 2024-01-29 DIAGNOSIS — Z90.49 HISTORY OF COLON RESECTION: ICD-10-CM

## 2024-01-29 DIAGNOSIS — R19.7 DIARRHEA, UNSPECIFIED TYPE: ICD-10-CM

## 2024-01-29 DIAGNOSIS — K91.2 POSTSURGICAL MALABSORPTION, NOT ELSEWHERE CLASSIFIED: ICD-10-CM

## 2024-01-29 LAB
25(OH)D3 SERPL-MCNC: 28.2 NG/ML (ref 30–100)
ALBUMIN SERPL-MCNC: 4.2 G/DL (ref 3.5–5.2)
ALBUMIN/GLOB SERPL: 1.2 G/DL
ALP SERPL-CCNC: 63 U/L (ref 39–117)
ALT SERPL W P-5'-P-CCNC: 13 U/L (ref 1–41)
ANION GAP SERPL CALCULATED.3IONS-SCNC: 9 MMOL/L (ref 5–15)
AST SERPL-CCNC: 17 U/L (ref 1–40)
BASOPHILS # BLD AUTO: 0.03 10*3/MM3 (ref 0–0.2)
BASOPHILS NFR BLD AUTO: 0.4 % (ref 0–1.5)
BILIRUB SERPL-MCNC: 0.4 MG/DL (ref 0–1.2)
BUN SERPL-MCNC: 15 MG/DL (ref 8–23)
BUN/CREAT SERPL: 16.9 (ref 7–25)
CALCIUM SPEC-SCNC: 10 MG/DL (ref 8.6–10.5)
CHLORIDE SERPL-SCNC: 102 MMOL/L (ref 98–107)
CO2 SERPL-SCNC: 28 MMOL/L (ref 22–29)
CREAT SERPL-MCNC: 0.89 MG/DL (ref 0.76–1.27)
CRP SERPL-MCNC: 1.12 MG/DL (ref 0–0.5)
DEPRECATED RDW RBC AUTO: 49.1 FL (ref 37–54)
EGFRCR SERPLBLD CKD-EPI 2021: 89.4 ML/MIN/1.73
EOSINOPHIL # BLD AUTO: 0.03 10*3/MM3 (ref 0–0.4)
EOSINOPHIL NFR BLD AUTO: 0.4 % (ref 0.3–6.2)
ERYTHROCYTE [DISTWIDTH] IN BLOOD BY AUTOMATED COUNT: 15.1 % (ref 12.3–15.4)
ERYTHROCYTE [SEDIMENTATION RATE] IN BLOOD: 21 MM/HR (ref 0–20)
GLOBULIN UR ELPH-MCNC: 3.4 GM/DL
GLUCOSE SERPL-MCNC: 97 MG/DL (ref 65–99)
HCT VFR BLD AUTO: 35.5 % (ref 37.5–51)
HGB BLD-MCNC: 11.9 G/DL (ref 13–17.7)
IMM GRANULOCYTES # BLD AUTO: 0.04 10*3/MM3 (ref 0–0.05)
IMM GRANULOCYTES NFR BLD AUTO: 0.6 % (ref 0–0.5)
IRON 24H UR-MRATE: 51 MCG/DL (ref 59–158)
IRON SATN MFR SERPL: 16 % (ref 20–50)
LYMPHOCYTES # BLD AUTO: 1.72 10*3/MM3 (ref 0.7–3.1)
LYMPHOCYTES NFR BLD AUTO: 25.7 % (ref 19.6–45.3)
MCH RBC QN AUTO: 29.5 PG (ref 26.6–33)
MCHC RBC AUTO-ENTMCNC: 33.5 G/DL (ref 31.5–35.7)
MCV RBC AUTO: 88.1 FL (ref 79–97)
MONOCYTES # BLD AUTO: 0.71 10*3/MM3 (ref 0.1–0.9)
MONOCYTES NFR BLD AUTO: 10.6 % (ref 5–12)
NEUTROPHILS NFR BLD AUTO: 4.16 10*3/MM3 (ref 1.7–7)
NEUTROPHILS NFR BLD AUTO: 62.3 % (ref 42.7–76)
NRBC BLD AUTO-RTO: 0 /100 WBC (ref 0–0.2)
PLATELET # BLD AUTO: 256 10*3/MM3 (ref 140–450)
PMV BLD AUTO: 10.2 FL (ref 6–12)
POTASSIUM SERPL-SCNC: 4.4 MMOL/L (ref 3.5–5.2)
PROT SERPL-MCNC: 7.6 G/DL (ref 6–8.5)
RBC # BLD AUTO: 4.03 10*6/MM3 (ref 4.14–5.8)
SODIUM SERPL-SCNC: 139 MMOL/L (ref 136–145)
TIBC SERPL-MCNC: 328 MCG/DL (ref 298–536)
TRANSFERRIN SERPL-MCNC: 220 MG/DL (ref 200–360)
WBC NRBC COR # BLD AUTO: 6.69 10*3/MM3 (ref 3.4–10.8)

## 2024-01-29 PROCEDURE — 86140 C-REACTIVE PROTEIN: CPT

## 2024-01-29 PROCEDURE — 84466 ASSAY OF TRANSFERRIN: CPT

## 2024-01-29 PROCEDURE — 83993 ASSAY FOR CALPROTECTIN FECAL: CPT

## 2024-01-29 PROCEDURE — 82306 VITAMIN D 25 HYDROXY: CPT

## 2024-01-29 PROCEDURE — 85652 RBC SED RATE AUTOMATED: CPT

## 2024-01-29 PROCEDURE — 83540 ASSAY OF IRON: CPT

## 2024-01-29 PROCEDURE — 80053 COMPREHEN METABOLIC PANEL: CPT

## 2024-01-29 PROCEDURE — 85025 COMPLETE CBC W/AUTO DIFF WBC: CPT

## 2024-01-31 ENCOUNTER — TELEPHONE (OUTPATIENT)
Dept: GASTROENTEROLOGY | Facility: CLINIC | Age: 74
End: 2024-01-31
Payer: MEDICARE

## 2024-01-31 RX ORDER — ERGOCALCIFEROL 1.25 MG/1
50000 CAPSULE ORAL
Qty: 12 CAPSULE | Refills: 1 | Status: SHIPPED | OUTPATIENT
Start: 2024-01-31

## 2024-01-31 RX ORDER — FERROUS SULFATE 325(65) MG
325 TABLET ORAL
Qty: 90 TABLET | Refills: 1 | Status: SHIPPED | OUTPATIENT
Start: 2024-01-31

## 2024-02-01 ENCOUNTER — TELEPHONE (OUTPATIENT)
Dept: GASTROENTEROLOGY | Facility: CLINIC | Age: 74
End: 2024-02-01
Payer: MEDICARE

## 2024-02-01 DIAGNOSIS — Z86.010 PERSONAL HISTORY OF COLONIC POLYPS: ICD-10-CM

## 2024-02-01 DIAGNOSIS — K52.9 COLITIS: Primary | ICD-10-CM

## 2024-02-01 LAB — CALPROTECTIN STL-MCNT: 2030 UG/G (ref 0–120)

## 2024-02-01 RX ORDER — PREDNISONE 10 MG/1
TABLET ORAL
Qty: 50 TABLET | Refills: 0 | Status: SHIPPED | OUTPATIENT
Start: 2024-02-01 | End: 2024-02-20

## 2024-02-01 NOTE — TELEPHONE ENCOUNTER
----- Message from LARISA Cross sent at 2/1/2024  4:06 PM EST -----  Please let pt know that stool test shows a significant amount of inflammation in the colon.  I think this is contributing to his diarrhea.  I had previously ordered IBD serology for him but it looks like it was  never completed.  With his fecal chris. Being so high, I'm concerned that he could have crohns or UC and would like for him to complete this lab.  I have entered the order again.  I would also like to treat him with a prednisone taper to see if this helps his symptoms.

## 2024-04-01 ENCOUNTER — TELEPHONE (OUTPATIENT)
Dept: GASTROENTEROLOGY | Facility: CLINIC | Age: 74
End: 2024-04-01
Payer: MEDICARE

## 2024-04-25 ENCOUNTER — LAB (OUTPATIENT)
Dept: LAB | Facility: HOSPITAL | Age: 74
End: 2024-04-25
Payer: MEDICARE

## 2024-04-25 DIAGNOSIS — K52.9 COLITIS: ICD-10-CM

## 2024-04-25 DIAGNOSIS — Z86.010 PERSONAL HISTORY OF COLONIC POLYPS: ICD-10-CM

## 2024-04-25 PROCEDURE — 36415 COLL VENOUS BLD VENIPUNCTURE: CPT

## 2024-05-04 LAB — REF LAB TEST METHOD: NORMAL

## 2024-05-06 ENCOUNTER — TELEPHONE (OUTPATIENT)
Dept: GASTROENTEROLOGY | Facility: CLINIC | Age: 74
End: 2024-05-06
Payer: MEDICARE

## 2024-05-20 LAB — REF LAB TEST METHOD: NORMAL

## 2024-06-14 ENCOUNTER — TRANSCRIBE ORDERS (OUTPATIENT)
Dept: ADMINISTRATIVE | Facility: HOSPITAL | Age: 74
End: 2024-06-14
Payer: MEDICARE

## 2024-06-14 DIAGNOSIS — R00.1 BRADYCARDIA: Primary | ICD-10-CM

## 2024-07-15 ENCOUNTER — OFFICE VISIT (OUTPATIENT)
Dept: GASTROENTEROLOGY | Facility: CLINIC | Age: 74
End: 2024-07-15
Payer: MEDICARE

## 2024-07-15 VITALS
BODY MASS INDEX: 23.79 KG/M2 | WEIGHT: 165.8 LBS | SYSTOLIC BLOOD PRESSURE: 142 MMHG | DIASTOLIC BLOOD PRESSURE: 63 MMHG | HEART RATE: 54 BPM | OXYGEN SATURATION: 100 %

## 2024-07-15 DIAGNOSIS — R19.7 DIARRHEA, UNSPECIFIED TYPE: ICD-10-CM

## 2024-07-15 DIAGNOSIS — Z90.49 HISTORY OF COLON RESECTION: ICD-10-CM

## 2024-07-15 DIAGNOSIS — K62.89 PROCTITIS: Primary | ICD-10-CM

## 2024-07-15 PROCEDURE — G2211 COMPLEX E/M VISIT ADD ON: HCPCS | Performed by: NURSE PRACTITIONER

## 2024-07-15 PROCEDURE — 1159F MED LIST DOCD IN RCRD: CPT | Performed by: NURSE PRACTITIONER

## 2024-07-15 PROCEDURE — 99214 OFFICE O/P EST MOD 30 MIN: CPT | Performed by: NURSE PRACTITIONER

## 2024-07-15 PROCEDURE — 1160F RVW MEDS BY RX/DR IN RCRD: CPT | Performed by: NURSE PRACTITIONER

## 2024-07-15 RX ORDER — LOPERAMIDE HYDROCHLORIDE 2 MG/1
2 CAPSULE ORAL 4 TIMES DAILY PRN
Qty: 360 CAPSULE | Refills: 1 | Status: SHIPPED | OUTPATIENT
Start: 2024-07-15

## 2024-07-15 RX ORDER — DICYCLOMINE HYDROCHLORIDE 10 MG/1
10 CAPSULE ORAL
Qty: 360 CAPSULE | Refills: 1 | Status: SHIPPED | OUTPATIENT
Start: 2024-07-15

## 2024-07-15 RX ORDER — MESALAMINE 1000 MG/1
1000 SUPPOSITORY RECTAL NIGHTLY
Qty: 30 SUPPOSITORY | Refills: 2 | Status: SHIPPED | OUTPATIENT
Start: 2024-07-15

## 2024-07-15 RX ORDER — DIPHENOXYLATE HYDROCHLORIDE AND ATROPINE SULFATE 2.5; .025 MG/1; MG/1
1 TABLET ORAL 4 TIMES DAILY
Qty: 120 TABLET | Refills: 5 | Status: SHIPPED | OUTPATIENT
Start: 2024-07-15

## 2024-07-15 NOTE — PROGRESS NOTES
Chief Complaint     Ulcerative Colitis and Diarrhea (Pt states he has to use the bathroom 5-7 times a day)    History of Present Illness     Salvador Huertas is a 73 y.o. male who presents to [facility] GASTROENTEROLOGY for follow-up of hx of colon resection, proctitis and diarrhea.      He reports 5-7 bowel movements per day.  He has the sensational of incomplete evacuation.  When this occurs, stool gets loser throughout the day.      When he takes one lomitil, one dicyclomine and one loperamide each morning, diarrhea is controlled until midday.  He takes these when he's leaving home.      Previously tried colestid without improvement.  He has also been on oral mesalamine for several months without improvement.  Fecal calprotectin was elevated following last visit, treated with prednisone taper and he denies improvement.  Previous colonoscopy with severe proctitis and ulcer.         History      Past Medical History:   Diagnosis Date    Cancer     Colorectal cancer    Hypertension      Past Surgical History:   Procedure Laterality Date    CATARACT EXTRACTION, BILATERAL Bilateral 06/2020    COLON RESECTION  2012    COLONOSCOPY      COLONOSCOPY N/A 11/03/2022    Procedure: COLONOSCOPY WITH BIOPSIES, POLYPECTOMY HOT SNARE;  Surgeon: Tata Villatoro MD;  Location: [facility] ENDOSCOPY;  Service: Gastroenterology;  Laterality: N/A;  COLON POLYP, RECTUM ULCER    CYST REMOVAL  2012    CYST REMOVAL TOP OF HEAD    HERNIA REPAIR Right 2012    LUNG SURGERY Right 06/06/2016    INTUBATING BRONCH, RIGHT VATS WEDGE RESEC. RML PN,       Family History   Problem Relation Age of Onset    Hypertension Mother     Heart disease Mother     Heart disease Father     Heart disease Brother     Malig Hyperthermia Neg Hx     Colon cancer Neg Hx         Current Medications       Current Outpatient Medications:     dicyclomine (BENTYL) 10 MG capsule, Take 1 capsule by mouth 4 (Four) Times a Day Before Meals  & at Bedtime., Disp: 360 capsule, Rfl: 1    diphenoxylate-atropine (LOMOTIL) 2.5-0.025 MG per tablet, Take 1 tablet by mouth 4 (Four) Times a Day., Disp: 120 tablet, Rfl: 5    ferrous sulfate 325 (65 FE) MG tablet, Take 1 tablet by mouth Daily With Breakfast., Disp: 90 tablet, Rfl: 1    hydroCHLOROthiazide (HYDRODIURIL) 25 MG tablet, Take 1 tablet by mouth Daily., Disp: , Rfl:     loperamide (IMODIUM) 2 MG capsule, Take 1 capsule by mouth 4 (Four) Times a Day As Needed for Diarrhea., Disp: 360 capsule, Rfl: 1    losartan (COZAAR) 100 MG tablet, , Disp: , Rfl:     mesalamine (Canasa) 1000 MG suppository, Insert 1 suppository into the rectum Every Night., Disp: 30 suppository, Rfl: 2    vitamin D (ERGOCALCIFEROL) 1.25 MG (94851 UT) capsule capsule, Take 1 capsule by mouth Every 7 (Seven) Days. (Patient not taking: Reported on 7/15/2024), Disp: 12 capsule, Rfl: 1     Allergies     Allergies   Allergen Reactions    Contrast Dye (Echo Or Unknown Ct/Mr) Unknown - Low Severity    Iodine Unknown - Low Severity       Social History       Social History     Social History Narrative    Not on file         Objective       /63 (BP Location: Left arm, Patient Position: Sitting, Cuff Size: Large Adult)   Pulse 54   Wt 75.2 kg (165 lb 12.8 oz)   SpO2 100%   BMI 23.79 kg/m²       Physical Exam  Constitutional:       General: He is not in acute distress.     Appearance: Normal appearance. He is well-developed and normal weight.   HENT:      Head: Normocephalic and atraumatic.   Eyes:      Conjunctiva/sclera: Conjunctivae normal.      Pupils: Pupils are equal, round, and reactive to light.      Visual Fields: Right eye visual fields normal and left eye visual fields normal.   Cardiovascular:      Rate and Rhythm: Normal rate.   Pulmonary:      Effort: Pulmonary effort is normal. No respiratory distress or retractions.      Breath sounds: Normal air entry.   Musculoskeletal:         General: Normal range of motion.      Right  lower leg: No edema.      Left lower leg: No edema.   Skin:     General: Skin is warm and dry.      Findings: No lesion.   Neurological:      General: No focal deficit present.      Mental Status: He is alert and oriented to person, place, and time.   Psychiatric:         Mood and Affect: Mood and affect normal.         Behavior: Behavior normal.         Results       Result Review :    The following data was reviewed by: LARISA Cross on 07/15/2024:    CBC w/diff          1/29/2024    10:00   CBC w/Diff   WBC 6.69    RBC 4.03    Hemoglobin 11.9    Hematocrit 35.5    MCV 88.1    MCH 29.5    MCHC 33.5    RDW 15.1    Platelets 256    Neutrophil Rel % 62.3    Immature Granulocyte Rel % 0.6    Lymphocyte Rel % 25.7    Monocyte Rel % 10.6    Eosinophil Rel % 0.4    Basophil Rel % 0.4      CMP          1/29/2024    10:00   CMP   Glucose 97    BUN 15    Creatinine 0.89    EGFR 89.4    Sodium 139    Potassium 4.4    Chloride 102    Calcium 10.0    Total Protein 7.6    Albumin 4.2    Globulin 3.4    Total Bilirubin 0.4    Alkaline Phosphatase 63    AST (SGOT) 17    ALT (SGPT) 13    Albumin/Globulin Ratio 1.2    BUN/Creatinine Ratio 16.9    Anion Gap 9.0        Iron Profile   Iron   Date Value Ref Range Status   01/29/2024 51 (L) 59 - 158 mcg/dL Final     TIBC   Date Value Ref Range Status   01/29/2024 328 298 - 536 mcg/dL Final     Iron Saturation (TSAT)   Date Value Ref Range Status   01/29/2024 16 (L) 20 - 50 % Final     Transferrin   Date Value Ref Range Status   01/29/2024 220 200 - 360 mg/dL Final     ESR (Sed Rate)   Sed Rate   Date Value Ref Range Status   01/29/2024 21 (H) 0 - 20 mm/hr Final     CRP (C-Reactive)   C-Reactive Protein   Date Value Ref Range Status   01/29/2024 1.12 (H) 0.00 - 0.50 mg/dL Final     Vitamin D   25 Hydroxy, Vitamin D   Date Value Ref Range Status   01/29/2024 28.2 (L) 30.0 - 100.0 ng/ml Final     1/29/24 fecal calprotectin - 2030 4/25/24 IBD serology - negative.          Assessment and Plan              Diagnoses and all orders for this visit:    1. Proctitis (Primary)  -     mesalamine (Canasa) 1000 MG suppository; Insert 1 suppository into the rectum Every Night.  Dispense: 30 suppository; Refill: 2    2. History of colon resection  -     dicyclomine (BENTYL) 10 MG capsule; Take 1 capsule by mouth 4 (Four) Times a Day Before Meals & at Bedtime.  Dispense: 360 capsule; Refill: 1  -     loperamide (IMODIUM) 2 MG capsule; Take 1 capsule by mouth 4 (Four) Times a Day As Needed for Diarrhea.  Dispense: 360 capsule; Refill: 1  -     diphenoxylate-atropine (LOMOTIL) 2.5-0.025 MG per tablet; Take 1 tablet by mouth 4 (Four) Times a Day.  Dispense: 120 tablet; Refill: 5    3. Diarrhea, unspecified type  -     dicyclomine (BENTYL) 10 MG capsule; Take 1 capsule by mouth 4 (Four) Times a Day Before Meals & at Bedtime.  Dispense: 360 capsule; Refill: 1  -     loperamide (IMODIUM) 2 MG capsule; Take 1 capsule by mouth 4 (Four) Times a Day As Needed for Diarrhea.  Dispense: 360 capsule; Refill: 1  -     diphenoxylate-atropine (LOMOTIL) 2.5-0.025 MG per tablet; Take 1 tablet by mouth 4 (Four) Times a Day.  Dispense: 120 tablet; Refill: 5      Recommend to treat proctitis with Canasa suppositories nightly.  Discontinue oral mesalamine and Colestid as patient has not seen any benefit with either of these.  Discussed with patient to take dicyclomine, loperamide and Lomotil with each meal to control diarrhea.  He will try this regimen and notify office if not effective.          Follow Up     Follow Up   Return in about 6 months (around 1/15/2025) for Diarrhea.  Patient was given instructions and counseling regarding his condition or for health maintenance advice. Please see specific information pulled into the AVS if appropriate.

## 2024-08-13 ENCOUNTER — HOSPITAL ENCOUNTER (OUTPATIENT)
Dept: CARDIOLOGY | Facility: HOSPITAL | Age: 74
Discharge: HOME | End: 2024-08-13
Admitting: FAMILY MEDICINE
Payer: MEDICARE

## 2024-08-13 DIAGNOSIS — R00.1 BRADYCARDIA: ICD-10-CM

## 2024-08-13 PROCEDURE — 93225 XTRNL ECG REC<48 HRS REC: CPT

## 2024-11-07 RX ORDER — ERGOCALCIFEROL 1.25 MG/1
50000 CAPSULE, LIQUID FILLED ORAL
Qty: 12 CAPSULE | Refills: 0 | Status: SHIPPED | OUTPATIENT
Start: 2024-11-07

## 2024-11-07 NOTE — TELEPHONE ENCOUNTER
Pt is requesting a refill of Vitamin D 1.25 MG     Last refill:  4/30/2024   Last ov: 7/15/2024  Next ov: 1/23/2025

## 2025-01-23 ENCOUNTER — OFFICE VISIT (OUTPATIENT)
Dept: GASTROENTEROLOGY | Facility: CLINIC | Age: 75
End: 2025-01-23
Payer: MEDICARE

## 2025-01-23 VITALS
BODY MASS INDEX: 23.22 KG/M2 | HEART RATE: 52 BPM | HEIGHT: 70 IN | SYSTOLIC BLOOD PRESSURE: 142 MMHG | OXYGEN SATURATION: 99 % | WEIGHT: 162.2 LBS | DIASTOLIC BLOOD PRESSURE: 64 MMHG

## 2025-01-23 DIAGNOSIS — K62.89 PROCTITIS: ICD-10-CM

## 2025-01-23 DIAGNOSIS — K91.2 POSTSURGICAL MALABSORPTION, NOT ELSEWHERE CLASSIFIED: ICD-10-CM

## 2025-01-23 DIAGNOSIS — R19.7 DIARRHEA, UNSPECIFIED TYPE: Primary | ICD-10-CM

## 2025-01-23 DIAGNOSIS — Z90.49 HISTORY OF COLON RESECTION: ICD-10-CM

## 2025-01-23 RX ORDER — DIPHENOXYLATE HYDROCHLORIDE AND ATROPINE SULFATE 2.5; .025 MG/1; MG/1
1 TABLET ORAL 4 TIMES DAILY
Qty: 120 TABLET | Refills: 5 | Status: SHIPPED | OUTPATIENT
Start: 2025-01-23

## 2025-01-23 RX ORDER — DICYCLOMINE HYDROCHLORIDE 10 MG/1
10 CAPSULE ORAL
Qty: 360 CAPSULE | Refills: 1 | Status: SHIPPED | OUTPATIENT
Start: 2025-01-23

## 2025-01-23 RX ORDER — LOPERAMIDE HYDROCHLORIDE 2 MG/1
2 CAPSULE ORAL 4 TIMES DAILY PRN
Qty: 360 CAPSULE | Refills: 1 | Status: SHIPPED | OUTPATIENT
Start: 2025-01-23

## 2025-01-23 NOTE — PROGRESS NOTES
Chief Complaint     Diarrhea (6m follow up, he hasn't been taking mesalamine or iron supplement for about 1m due to not having refills )    History of Present Illness     Salvador Huertas is a 74 y.o. male who presents to [facility] GASTROENTEROLOGY for follow-up of proctitis, hx of colon resection and diarrhea.     He used canasa suppositories as prescribed, but has been out of this for about one month.  He hasn't been able to tell much difference in symptoms since stopping these.      Diarrhea is now manageable with dicyclomine, loperamide and lomitil with each meal.  Denies rectal bleeding and abdominal pain.      No recent labs.  He's out of iron and vitamin D.         History      Past Medical History:   Diagnosis Date    Cancer     Colorectal cancer    Hypertension      Past Surgical History:   Procedure Laterality Date    CATARACT EXTRACTION, BILATERAL Bilateral 06/2020    COLON RESECTION  2012    COLONOSCOPY      COLONOSCOPY N/A 11/03/2022    Procedure: COLONOSCOPY WITH BIOPSIES, POLYPECTOMY HOT SNARE;  Surgeon: Tata Villatoro MD;  Location: [facility] ENDOSCOPY;  Service: Gastroenterology;  Laterality: N/A;  COLON POLYP, RECTUM ULCER    CYST REMOVAL  2012    CYST REMOVAL TOP OF HEAD    HERNIA REPAIR Right 2012    LUNG SURGERY Right 06/06/2016    INTUBATING BRONCH, RIGHT VATS WEDGE RESEC. RML PN,       Family History   Problem Relation Age of Onset    Hypertension Mother     Heart disease Mother     Heart disease Father     Heart disease Brother     Malig Hyperthermia Neg Hx     Colon cancer Neg Hx         Current Medications       Current Outpatient Medications:     dicyclomine (BENTYL) 10 MG capsule, Take 1 capsule by mouth 4 (Four) Times a Day Before Meals & at Bedtime., Disp: 360 capsule, Rfl: 1    diphenoxylate-atropine (LOMOTIL) 2.5-0.025 MG per tablet, Take 1 tablet by mouth 4 (Four) Times a Day., Disp: 120 tablet, Rfl: 5    hydroCHLOROthiazide (HYDRODIURIL) 25 MG  "tablet, Take 1 tablet by mouth Daily., Disp: , Rfl:     loperamide (IMODIUM) 2 MG capsule, Take 1 capsule by mouth 4 (Four) Times a Day As Needed for Diarrhea., Disp: 360 capsule, Rfl: 1    losartan (COZAAR) 100 MG tablet, , Disp: , Rfl:     vitamin D (ERGOCALCIFEROL) 1.25 MG (33457 UT) capsule capsule, TAKE 1 CAPSULE BY MOUTH ONCE PER WEEK, Disp: 12 capsule, Rfl: 0    ferrous sulfate 325 (65 FE) MG tablet, Take 1 tablet by mouth Daily With Breakfast. (Patient not taking: Reported on 1/23/2025), Disp: 90 tablet, Rfl: 1     Allergies     Allergies   Allergen Reactions    Contrast Dye (Echo Or Unknown Ct/Mr) Unknown - Low Severity    Iodine Unknown - Low Severity       Social History       Social History     Social History Narrative    Not on file         Objective       /64 (BP Location: Left arm, Patient Position: Sitting)   Pulse 52   Ht 177.8 cm (70\")   Wt 73.6 kg (162 lb 3.2 oz)   SpO2 99%   BMI 23.27 kg/m²       Physical Exam  Constitutional:       General: He is not in acute distress.     Appearance: Normal appearance. He is well-developed and normal weight.   HENT:      Head: Normocephalic and atraumatic.   Eyes:      Conjunctiva/sclera: Conjunctivae normal.      Pupils: Pupils are equal, round, and reactive to light.      Visual Fields: Right eye visual fields normal and left eye visual fields normal.   Cardiovascular:      Rate and Rhythm: Normal rate.   Pulmonary:      Effort: Pulmonary effort is normal. No respiratory distress or retractions.      Breath sounds: Normal air entry.   Abdominal:      General: There is no distension.      Tenderness: There is no abdominal tenderness.   Musculoskeletal:         General: Normal range of motion.      Right lower leg: No edema.      Left lower leg: No edema.   Skin:     General: Skin is warm and dry.      Findings: No lesion.   Neurological:      General: No focal deficit present.      Mental Status: He is alert and oriented to person, place, and time. "   Psychiatric:         Mood and Affect: Mood and affect normal.         Behavior: Behavior normal.         Results       Result Review :                     Assessment and Plan              Diagnoses and all orders for this visit:    1. Diarrhea, unspecified type (Primary)  -     dicyclomine (BENTYL) 10 MG capsule; Take 1 capsule by mouth 4 (Four) Times a Day Before Meals & at Bedtime.  Dispense: 360 capsule; Refill: 1  -     diphenoxylate-atropine (LOMOTIL) 2.5-0.025 MG per tablet; Take 1 tablet by mouth 4 (Four) Times a Day.  Dispense: 120 tablet; Refill: 5  -     loperamide (IMODIUM) 2 MG capsule; Take 1 capsule by mouth 4 (Four) Times a Day As Needed for Diarrhea.  Dispense: 360 capsule; Refill: 1    2. History of colon resection  -     dicyclomine (BENTYL) 10 MG capsule; Take 1 capsule by mouth 4 (Four) Times a Day Before Meals & at Bedtime.  Dispense: 360 capsule; Refill: 1  -     diphenoxylate-atropine (LOMOTIL) 2.5-0.025 MG per tablet; Take 1 tablet by mouth 4 (Four) Times a Day.  Dispense: 120 tablet; Refill: 5  -     loperamide (IMODIUM) 2 MG capsule; Take 1 capsule by mouth 4 (Four) Times a Day As Needed for Diarrhea.  Dispense: 360 capsule; Refill: 1    3. Postsurgical malabsorption, not elsewhere classified  -     CBC Auto Differential; Future  -     Iron Profile  -     Vitamin D,25-Hydroxy    4. Proctitis    Recommend to continue with current medication regimen as symptoms are now controlled.  Check labs to determine if he needs to continue with oral iron and vitamin D.        Follow Up     Follow Up   Return in about 9 months (around 10/23/2025) for Diarrhea.  Patient was given instructions and counseling regarding his condition or for health maintenance advice. Please see specific information pulled into the AVS if appropriate.

## 2025-01-27 ENCOUNTER — LAB (OUTPATIENT)
Dept: LAB | Facility: HOSPITAL | Age: 75
End: 2025-01-27
Payer: MEDICARE

## 2025-01-27 DIAGNOSIS — K91.2 POSTSURGICAL MALABSORPTION, NOT ELSEWHERE CLASSIFIED: ICD-10-CM

## 2025-01-27 LAB
25(OH)D3 SERPL-MCNC: 52.8 NG/ML (ref 30–100)
BASOPHILS # BLD AUTO: 0.02 10*3/MM3 (ref 0–0.2)
BASOPHILS NFR BLD AUTO: 0.4 % (ref 0–1.5)
DEPRECATED RDW RBC AUTO: 47.1 FL (ref 37–54)
EOSINOPHIL # BLD AUTO: 0.05 10*3/MM3 (ref 0–0.4)
EOSINOPHIL NFR BLD AUTO: 1 % (ref 0.3–6.2)
ERYTHROCYTE [DISTWIDTH] IN BLOOD BY AUTOMATED COUNT: 15.5 % (ref 12.3–15.4)
HCT VFR BLD AUTO: 32.1 % (ref 37.5–51)
HGB BLD-MCNC: 11.2 G/DL (ref 13–17.7)
IMM GRANULOCYTES # BLD AUTO: 0.03 10*3/MM3 (ref 0–0.05)
IMM GRANULOCYTES NFR BLD AUTO: 0.6 % (ref 0–0.5)
IRON 24H UR-MRATE: 70 MCG/DL (ref 59–158)
IRON SATN MFR SERPL: 22 % (ref 20–50)
LYMPHOCYTES # BLD AUTO: 1.83 10*3/MM3 (ref 0.7–3.1)
LYMPHOCYTES NFR BLD AUTO: 34.9 % (ref 19.6–45.3)
MCH RBC QN AUTO: 29.9 PG (ref 26.6–33)
MCHC RBC AUTO-ENTMCNC: 34.9 G/DL (ref 31.5–35.7)
MCV RBC AUTO: 85.6 FL (ref 79–97)
MONOCYTES # BLD AUTO: 0.48 10*3/MM3 (ref 0.1–0.9)
MONOCYTES NFR BLD AUTO: 9.2 % (ref 5–12)
NEUTROPHILS NFR BLD AUTO: 2.83 10*3/MM3 (ref 1.7–7)
NEUTROPHILS NFR BLD AUTO: 53.9 % (ref 42.7–76)
NRBC BLD AUTO-RTO: 0 /100 WBC (ref 0–0.2)
PLATELET # BLD AUTO: 251 10*3/MM3 (ref 140–450)
PMV BLD AUTO: 10 FL (ref 6–12)
RBC # BLD AUTO: 3.75 10*6/MM3 (ref 4.14–5.8)
TIBC SERPL-MCNC: 322 MCG/DL (ref 298–536)
TRANSFERRIN SERPL-MCNC: 216 MG/DL (ref 200–360)
WBC NRBC COR # BLD AUTO: 5.24 10*3/MM3 (ref 3.4–10.8)

## 2025-01-27 PROCEDURE — 85025 COMPLETE CBC W/AUTO DIFF WBC: CPT

## 2025-01-27 PROCEDURE — 82306 VITAMIN D 25 HYDROXY: CPT | Performed by: NURSE PRACTITIONER

## 2025-01-27 PROCEDURE — 84466 ASSAY OF TRANSFERRIN: CPT | Performed by: NURSE PRACTITIONER

## 2025-01-27 PROCEDURE — 83540 ASSAY OF IRON: CPT | Performed by: NURSE PRACTITIONER

## 2025-01-27 RX ORDER — FERROUS SULFATE 325(65) MG
325 TABLET ORAL
Qty: 90 TABLET | Refills: 1 | Status: SHIPPED | OUTPATIENT
Start: 2025-01-27

## 2025-01-28 ENCOUNTER — TELEPHONE (OUTPATIENT)
Dept: GASTROENTEROLOGY | Facility: CLINIC | Age: 75
End: 2025-01-28
Payer: MEDICARE

## 2025-01-28 NOTE — TELEPHONE ENCOUNTER
----- Message from Anuradha De Anda sent at 1/27/2025  7:46 PM EST -----  Vitamin D normal.  Okay to come off of supplement.  Iron normal, anemia stable.  Recommend to continue with iron supplement.  Refill sent.

## 2025-08-16 DIAGNOSIS — R19.7 DIARRHEA, UNSPECIFIED TYPE: ICD-10-CM

## 2025-08-16 DIAGNOSIS — Z90.49 HISTORY OF COLON RESECTION: ICD-10-CM

## 2025-08-18 RX ORDER — DIPHENOXYLATE HYDROCHLORIDE AND ATROPINE SULFATE 2.5; .025 MG/1; MG/1
1 TABLET ORAL EVERY 6 HOURS
Qty: 120 TABLET | Refills: 4 | Status: SHIPPED | OUTPATIENT
Start: 2025-08-18

## (undated) DEVICE — PAD GRND REM POLYHESIVE A/ DISP

## (undated) DEVICE — Device

## (undated) DEVICE — SINGLE-USE BIOPSY FORCEPS: Brand: RADIAL JAW 4

## (undated) DEVICE — SOLIDIFIER LIQLOC PLS 1500CC BT

## (undated) DEVICE — LINER SURG CANSTR SXN S/RIGD 1500CC

## (undated) DEVICE — THE SINGLE USE ETRAP – POLYP TRAP IS USED FOR SUCTION RETRIEVAL OF ENDOSCOPICALLY REMOVED POLYPS.: Brand: ETRAP

## (undated) DEVICE — SNAR POLYP CAPTIFLEX XS/OVL 11X2.4MM 240CM 1P/U

## (undated) DEVICE — SOL IRRG H2O PL/BG 1000ML STRL

## (undated) DEVICE — CONN JET HYDRA H20 AUXILIARY DISP

## (undated) DEVICE — Device: Brand: DEFENDO AIR/WATER/SUCTION AND BIOPSY VALVE